# Patient Record
Sex: MALE | Race: WHITE | NOT HISPANIC OR LATINO | Employment: OTHER | URBAN - METROPOLITAN AREA
[De-identification: names, ages, dates, MRNs, and addresses within clinical notes are randomized per-mention and may not be internally consistent; named-entity substitution may affect disease eponyms.]

---

## 2022-05-12 ENCOUNTER — NURSING HOME VISIT (OUTPATIENT)
Dept: GERIATRICS | Facility: OTHER | Age: 87
End: 2022-05-12
Payer: MEDICARE

## 2022-05-12 DIAGNOSIS — I12.9 RENAL HYPERTENSION: ICD-10-CM

## 2022-05-12 DIAGNOSIS — F41.9 ANXIETY: ICD-10-CM

## 2022-05-12 DIAGNOSIS — S62.015D CLOSED NONDISPLACED FRACTURE OF DISTAL POLE OF SCAPHOID OF LEFT WRIST WITH ROUTINE HEALING, SUBSEQUENT ENCOUNTER: ICD-10-CM

## 2022-05-12 DIAGNOSIS — S72.002D CLOSED FRACTURE OF NECK OF LEFT FEMUR WITH ROUTINE HEALING, SUBSEQUENT ENCOUNTER: Primary | ICD-10-CM

## 2022-05-12 DIAGNOSIS — I10 ESSENTIAL HYPERTENSION: ICD-10-CM

## 2022-05-12 DIAGNOSIS — N13.30 HYDRONEPHROSIS OF LEFT KIDNEY: ICD-10-CM

## 2022-05-12 DIAGNOSIS — N40.0 BENIGN NON-NODULAR PROSTATIC HYPERPLASIA: ICD-10-CM

## 2022-05-12 DIAGNOSIS — R41.89 COGNITIVE IMPAIRMENT: ICD-10-CM

## 2022-05-12 DIAGNOSIS — E89.0 POSTOPERATIVE HYPOTHYROIDISM: ICD-10-CM

## 2022-05-12 DIAGNOSIS — N17.9 AKI (ACUTE KIDNEY INJURY) (HCC): ICD-10-CM

## 2022-05-12 PROBLEM — M81.0 OSTEOPOROSIS: Status: ACTIVE | Noted: 2019-09-12
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
## 2022-05-12 PROBLEM — S72.002A CLOSED FRACTURE OF NECK OF LEFT FEMUR (HCC): Status: ACTIVE | Noted: 2022-04-21

## 2022-05-12 PROBLEM — I95.1 ORTHOSTATIC HYPOTENSION: Status: ACTIVE | Noted: 2018-07-10

## 2022-05-12 PROBLEM — M15.9 PRIMARY OSTEOARTHRITIS INVOLVING MULTIPLE JOINTS: Status: ACTIVE | Noted: 2021-12-09

## 2022-05-12 PROBLEM — Z87.81 S/P LEFT HIP FRACTURE: Status: ACTIVE | Noted: 2022-04-27

## 2022-05-12 PROBLEM — I31.3 PERICARDIAL EFFUSION: Status: ACTIVE | Noted: 2022-04-27

## 2022-05-12 PROBLEM — I31.39 PERICARDIAL EFFUSION: Status: ACTIVE | Noted: 2022-04-27

## 2022-05-12 PROBLEM — S62.015A CLOSED NONDISPLACED FRACTURE OF DISTAL POLE OF SCAPHOID BONE OF LEFT WRIST: Status: ACTIVE | Noted: 2022-04-21

## 2022-05-12 PROBLEM — E55.9 VITAMIN D DEFICIENCY: Status: ACTIVE | Noted: 2019-09-12

## 2022-05-12 PROBLEM — K86.2 PANCREATIC CYST: Status: ACTIVE | Noted: 2022-04-21

## 2022-05-12 PROBLEM — C73 PRIMARY MALIGNANT NEOPLASM OF THYROID GLAND (HCC): Status: ACTIVE | Noted: 2019-09-12

## 2022-05-12 PROBLEM — I70.1 ATHEROSCLEROSIS OF RENAL ARTERY (HCC): Status: ACTIVE | Noted: 2019-10-29

## 2022-05-12 PROCEDURE — 99306 1ST NF CARE HIGH MDM 50: CPT | Performed by: INTERNAL MEDICINE

## 2022-05-12 RX ORDER — LORAZEPAM 0.5 MG/1
0.5 TABLET ORAL 2 TIMES DAILY
COMMUNITY
Start: 2022-05-11 | End: 2022-05-21

## 2022-05-12 RX ORDER — OXYCODONE HYDROCHLORIDE 5 MG/1
2.5 TABLET ORAL EVERY 6 HOURS PRN
COMMUNITY
Start: 2022-05-11 | End: 2022-05-21

## 2022-05-12 RX ORDER — DEXAMETHASONE 4 MG/1
TABLET ORAL
COMMUNITY
Start: 2021-12-12

## 2022-05-12 RX ORDER — LIDOCAINE 4 G/G
1 PATCH TOPICAL DAILY
COMMUNITY
Start: 2022-05-12

## 2022-05-12 RX ORDER — SENNA PLUS 8.6 MG/1
8.6 TABLET ORAL 2 TIMES DAILY
COMMUNITY
Start: 2022-05-11 | End: 2023-05-11

## 2022-05-12 RX ORDER — LEVOTHYROXINE SODIUM 0.15 MG/1
TABLET ORAL
COMMUNITY
Start: 2021-12-12

## 2022-05-12 RX ORDER — HEPARIN SODIUM 5000 [USP'U]/ML
5000 INJECTION, SOLUTION INTRAVENOUS; SUBCUTANEOUS EVERY 8 HOURS SCHEDULED
COMMUNITY
End: 2022-05-31

## 2022-05-12 RX ORDER — LOSARTAN POTASSIUM 25 MG/1
25 TABLET ORAL DAILY
COMMUNITY
Start: 2022-05-12 | End: 2023-05-12

## 2022-05-12 RX ORDER — OMEPRAZOLE 20 MG/1
1 CAPSULE, DELAYED RELEASE ORAL DAILY
COMMUNITY
Start: 2021-12-12

## 2022-05-12 RX ORDER — TRAZODONE HYDROCHLORIDE 50 MG/1
25 TABLET ORAL
COMMUNITY
Start: 2022-05-11 | End: 2022-06-10

## 2022-05-12 RX ORDER — CETIRIZINE HYDROCHLORIDE 10 MG/1
5 TABLET ORAL DAILY
COMMUNITY
Start: 2022-05-12 | End: 2023-05-12

## 2022-05-12 RX ORDER — FINASTERIDE 5 MG/1
5 TABLET, FILM COATED ORAL DAILY
COMMUNITY
Start: 2021-12-12

## 2022-05-12 RX ORDER — ACETAMINOPHEN 500 MG
1000 TABLET ORAL 3 TIMES DAILY
COMMUNITY
Start: 2022-05-11 | End: 2022-05-21

## 2022-05-12 RX ORDER — POLYETHYLENE GLYCOL 3350 17 G/17G
17 POWDER, FOR SOLUTION ORAL DAILY
COMMUNITY
Start: 2021-12-12

## 2022-05-12 RX ORDER — UREA 10 %
500 LOTION (ML) TOPICAL 4 TIMES DAILY PRN
COMMUNITY
Start: 2022-05-11 | End: 2023-05-11

## 2022-05-12 RX ORDER — TERAZOSIN 2 MG/1
2 CAPSULE ORAL
COMMUNITY
Start: 2021-12-12

## 2022-05-12 RX ORDER — CYCLOSPORINE 0.5 MG/ML
EMULSION OPHTHALMIC
COMMUNITY
Start: 2021-12-12

## 2022-05-12 NOTE — PROGRESS NOTES
[facility] notes  SHORT TERM REHAB       NAME: Mamadou Crowder  AGE: 80 y o  SEX: male    DATE OF ENCOUNTER: 5/12/2022    Assessment and Plan   Closed fracture of neck of left femur (HCC)  S/p left hip hemiarthroplasty on 4/22/22  WBAT  Pain controlled  Rehab consult   Heparin DVT prop for now   Continue follow up with orthopedics     Closed nondisplaced fracture of distal pole of scaphoid bone of left wrist  Non surgical management   Continue follow up with orthopedics  As per last report obtained patient is WBAT   Rehab consult  Pain controlled     Cognitive impairment  Daughter reports cognition has been on the decline even before hospitalization  Continue with supportive care  Continue with safety measures     SHERON (acute kidney injury) ([facility] [address] )  Seems to have improved  Also due urinary retention for which he had catheter and was removed at the acute rehab  But does have report of CKDIII  Will repeat BMP     Hydronephrosis of left kidney  Secondary to urinary retention which resolved with willard cath  Continue monitoring for any new symptoms of urinary retention     Essential hypertension  Continue current meds  Continue monitoring BP     Hypothyroidism  Continue synthroid  Secondary to thyroid resection and thyroid cancer    Benign non-nodular prostatic hyperplasia  Continue current meds  Continue monitoring symptoms    Renal hypertension  Hx of stent  Continue current meds     Anxiety  Continue current meds  Continue monitoring symptoms           Chief Complaint     No new complaints    History of Present Illness     81 yo male seen for admission to [address] [place] [address] after hospitalization  As per history obtained from patient he had a fall and his wife called 911 and took him to the hospital  But patient had to be cued for some history as memory seems to be not good   With cueing he was aware that he had left hip fracture and left wrist fracture then he recalls going somewhere by the [address] but does not know the place  Called daughter and talked to her to get the details  Patient was admitted to hospital after fall like he said and had surgery for his left hip and cast for the left wrist  He also developed urinary retention with left hydronephrosis which improved with the willard placement  Once he was stable he was discharged to acute rehab in [facility]  Patient continued with therapy and management for his left hip fracture and left wrist fracture then was discharged to [address] [place] [address] for continued therapy   Reviewed labs and imaging report from the hospital      PMHx     Past Medical History:   Diagnosis Date    Allergic     Anemia     Arthritis     Asthma     BPH (benign prostatic hyperplasia)     Chronic kidney disease     GERD (gastroesophageal reflux disease)     Hypertension     Other irritable bowel syndrome     Other specified hypothyroidism     Primary insomnia     Thyroid cancer (HCC)     invasive follicular lymphoma     Past Surgical History:   Procedure Laterality Date    APPENDECTOMY      CHOLECYSTECTOMY      RENAL ARTERY STENT Right     THYROIDECTOMY       Family History   Problem Relation Age of Onset    Colon cancer Mother     Kidney disease Father     Dementia Sister     Hypertension Sister      Social History     Socioeconomic History    Marital status: /Civil Union     Spouse name: None    Number of children: None    Years of education: None    Highest education level: None   Occupational History    None   Tobacco Use    Smoking status: Former Smoker    Smokeless tobacco: Never Used   Substance and Sexual Activity    Alcohol use: Not Currently    Drug use: Never    Sexual activity: None   Other Topics Concern    None   Social History Narrative    None     Social Determinants of Health     Financial Resource Strain: Not on file   Food Insecurity: Not on file   Transportation Needs: Not on file   Physical Activity: Not on file   Stress: Not on file Social Connections: Not on file   Intimate Partner Violence: Not on file   Housing Stability: Not on file     Allergies   Allergen Reactions    Ace Inhibitors Other (See Comments)     cough and rash  Other reaction(s): Other (See Comments)  cough and rash      Amoxicillin Other (See Comments)     GI upset  Other reaction(s): Other (See Comments)  GI upset         Review of Systems     Denies any pain or shortness of breath  All other review of system negative      Objective   Vital signs:  BP: 145/57  HR: 72  RR: 18  TEMP: 98 4F    PHYSICAL EXAM:  GENERAL: no acute distress  SKIN: warm, dry, no rash, no cyanosis  HEENT: normocephalic, atraumatic, no JVD, no Thyromegaly, no lymphadenopathy  LUNGS: CTA, no wheezing, no rales, expanded equally, no chest tenderness   HEART: normal rhythm, normal rate, no murmur, no gallop  ABDOMEN: soft non tender non distended bs+, no guarding or rebound tenderness  :  no suprapubic tenderness  MUSCULOSKELETAL: strength about 4+/5 all extremities except left lower decreased, ROM within normal, bilateral lower leg edema pitting +1, no calf tenderness  NEUROLOGY: awake, alert, Ox3 but did not know the facility name, able to recall 1/3 object but able to do the clock draw  CN2-12 intact  PSYCH: cooperative, pleasant  Pertinent Laboratory/Diagnostic Studies:  Recent labs and diagnostic tests reviewed in nursing home EMR    Current Medications   Medications reviewed and signed off on nursing home EMR

## 2022-05-12 NOTE — ASSESSMENT & PLAN NOTE
S/p left hip hemiarthroplasty on 4/22/22  WBAT  Pain controlled  Rehab consult   Heparin DVT prop for now   Continue follow up with orthopedics

## 2022-05-12 NOTE — ASSESSMENT & PLAN NOTE
Non surgical management   Continue follow up with orthopedics  As per last report obtained patient is WBAT   Rehab consult  Pain controlled

## 2022-05-12 NOTE — ASSESSMENT & PLAN NOTE
Seems to have improved  Also due urinary retention for which he had catheter and was removed at the acute rehab  But does have report of CKDIII  Will repeat BMP

## 2022-05-12 NOTE — ASSESSMENT & PLAN NOTE
Daughter reports cognition has been on the decline even before hospitalization  Continue with supportive care  Continue with safety measures

## 2022-05-12 NOTE — ASSESSMENT & PLAN NOTE
Secondary to urinary retention which resolved with willard cath  Continue monitoring for any new symptoms of urinary retention

## 2022-05-20 ENCOUNTER — NURSING HOME VISIT (OUTPATIENT)
Dept: GERIATRICS | Facility: OTHER | Age: 87
End: 2022-05-20
Payer: MEDICARE

## 2022-05-20 DIAGNOSIS — S62.015D CLOSED NONDISPLACED FRACTURE OF DISTAL POLE OF SCAPHOID OF LEFT WRIST WITH ROUTINE HEALING, SUBSEQUENT ENCOUNTER: ICD-10-CM

## 2022-05-20 DIAGNOSIS — N18.31 STAGE 3A CHRONIC KIDNEY DISEASE (HCC): ICD-10-CM

## 2022-05-20 DIAGNOSIS — S72.002D CLOSED FRACTURE OF NECK OF LEFT FEMUR WITH ROUTINE HEALING, SUBSEQUENT ENCOUNTER: Primary | ICD-10-CM

## 2022-05-20 DIAGNOSIS — D62 ACUTE BLOOD LOSS ANEMIA: ICD-10-CM

## 2022-05-20 PROCEDURE — 99309 SBSQ NF CARE MODERATE MDM 30: CPT | Performed by: INTERNAL MEDICINE

## 2022-05-20 NOTE — PROGRESS NOTES
[facility] notes  SHORT TERM REHAB       NAME: Micky Floyd  AGE: 80 y o  SEX: male    DATE OF ENCOUNTER: 5/20/2022    Assessment and Plan   Closed fracture of neck of left femur (HCC)  S/p left hip hemiarthroplasty on 4/22/22  WBAT  Continues to work with therapy  Pain seems to be controlled  Continue follow up with orthopedics   Continues on heparin for DVT prop    Stage 3a chronic kidney disease ([address] )  Cr up slightly at 1 31  Will repeat next week   Encourage fluids    Acute blood loss anemia  Hb down to 10 3  No signs of bleeding   Will repeat to monitor    Closed nondisplaced fracture of distal pole of scaphoid bone of left wrist  Seems to be doing well  Pain controlled  Continue follow up with orthopedics  WBAT      Chief Complaint     No new complaints     History of Present Illness     79 yo male seen for follow up  Patient seen for left hip fracture, anemia, SHERON and left wrist fracture  Reviewed nursing notes since last visit       PMHx     Past Medical History:   Diagnosis Date    Allergic     Anemia     Arthritis     Asthma     BPH (benign prostatic hyperplasia)     Chronic kidney disease     GERD (gastroesophageal reflux disease)     Hypertension     Other irritable bowel syndrome     Other specified hypothyroidism     Primary insomnia     Thyroid cancer (HCC)     invasive follicular lymphoma     Past Surgical History:   Procedure Laterality Date    APPENDECTOMY      CHOLECYSTECTOMY      RENAL ARTERY STENT Right     THYROIDECTOMY       Family History   Problem Relation Age of Onset    Colon cancer Mother     Kidney disease Father     Dementia Sister     Hypertension Sister      Social History     Socioeconomic History    Marital status: /Civil Union     Spouse name: Not on file    Number of children: Not on file    Years of education: Not on file    Highest education level: Not on file   Occupational History    Not on file   Tobacco Use    Smoking status: Former Smoker    Smokeless tobacco: Never Used   Substance and Sexual Activity    Alcohol use: Not Currently    Drug use: Never    Sexual activity: Not on file   Other Topics Concern    Not on file   Social History Narrative    Not on file     Social Determinants of Health     Financial Resource Strain: Not on file   Food Insecurity: Not on file   Transportation Needs: Not on file   Physical Activity: Not on file   Stress: Not on file   Social Connections: Not on file   Intimate Partner Violence: Not on file   Housing Stability: Not on file     Allergies   Allergen Reactions    Ace Inhibitors Other (See Comments)     cough and rash  Other reaction(s): Other (See Comments)  cough and rash      Amoxicillin Other (See Comments)     GI upset  Other reaction(s): Other (See Comments)  GI upset         Review of Systems     Constipated  All other review of system negative      Objective   Vital signs:  BP: 140/57  HR: 75  RR: 16  TEMP: 98 6F      PHYSICAL EXAM:  GENERAL: no acute distress  SKIN: warm, dry, no rash, no cyanosis  HEENT: normocephalic, atraumatic, no JVD, no Thyromegaly, no lymphadenopathy  LUNGS: CTA, no wheezing, no rales, expanded equally, no chest tenderness   HEART: normal rhythm, normal rate, no murmur, no gallop  ABDOMEN: soft non tender non distended bs+, no guarding or rebound tenderness  :  no suprapubic tenderness  MUSCULOSKELETAL: strength about 4+/5 all extremities except left lower decreased, bilateral lower leg edema, no calf tenderness  NEUROLOGY: awake, alert, CN2-12 intact  PSYCH: cooperative, pleasant         Pertinent Laboratory/Diagnostic Studies:  Recent labs and diagnostic tests reviewed in nursing home EMR    Current Medications   Medications reviewed

## 2022-05-20 NOTE — ASSESSMENT & PLAN NOTE
S/p left hip hemiarthroplasty on 4/22/22  WBAT  Continues to work with therapy  Pain seems to be controlled  Continue follow up with orthopedics   Continues on heparin for DVT prop

## 2022-05-25 ENCOUNTER — NURSING HOME VISIT (OUTPATIENT)
Dept: GERIATRICS | Facility: OTHER | Age: 87
End: 2022-05-25
Payer: MEDICARE

## 2022-05-25 DIAGNOSIS — S72.002D CLOSED FRACTURE OF NECK OF LEFT FEMUR WITH ROUTINE HEALING, SUBSEQUENT ENCOUNTER: Primary | ICD-10-CM

## 2022-05-25 DIAGNOSIS — I10 ESSENTIAL HYPERTENSION: ICD-10-CM

## 2022-05-25 DIAGNOSIS — D62 ACUTE BLOOD LOSS ANEMIA: ICD-10-CM

## 2022-05-25 PROCEDURE — 99309 SBSQ NF CARE MODERATE MDM 30: CPT | Performed by: INTERNAL MEDICINE

## 2022-05-25 RX ORDER — NIFEDIPINE 60 MG/1
60 TABLET, EXTENDED RELEASE ORAL DAILY
COMMUNITY

## 2022-05-25 RX ORDER — OXYCODONE HYDROCHLORIDE 5 MG/1
2.5 TABLET ORAL EVERY 6 HOURS PRN
COMMUNITY
End: 2022-05-31

## 2022-05-25 NOTE — ASSESSMENT & PLAN NOTE
S/p left hip hemiarthroplasty on 4/22/22  WBAT  Continues with therapy  Reviewed therapy notes  Therapist report some difficulty with the left knee   Continue with safety measures  Continue follow up with orthopedics as scheduled

## 2022-05-25 NOTE — PROGRESS NOTES
[facility] notes  SHORT TERM REHAB       NAME: Hayes Christiansen  AGE: 80 y o  SEX: male    DATE OF ENCOUNTER: 5/25/2022    Assessment and Plan   Closed fracture of neck of left femur (HCC)  S/p left hip hemiarthroplasty on 4/22/22  WBAT  Continues with therapy  Reviewed therapy notes  Therapist report some difficulty with the left knee   Continue with safety measures  Continue follow up with orthopedics as scheduled     Acute blood loss anemia  Repeat Hb was at 10 8 on 5/23/22  No signs of bleeding and h/h stable    Essential hypertension  BP reviewed since admission seems to be in acceptable range  Continue current meds  Continue monitoring BP        Chief Complaint     No new complaints     History of Present Illness     81 yo male seen for follow up  Patient seen for left hip fracture s/p surgery, anemia and HTN  Reviewed nursing notes since last visit       PMHx     Past Medical History:   Diagnosis Date    Allergic     Anemia     Arthritis     Asthma     BPH (benign prostatic hyperplasia)     Chronic kidney disease     GERD (gastroesophageal reflux disease)     Hypertension     Other irritable bowel syndrome     Other specified hypothyroidism     Primary insomnia     Thyroid cancer (HCC)     invasive follicular lymphoma     Past Surgical History:   Procedure Laterality Date    APPENDECTOMY      CHOLECYSTECTOMY      RENAL ARTERY STENT Right     THYROIDECTOMY       Family History   Problem Relation Age of Onset    Colon cancer Mother     Kidney disease Father     Dementia Sister     Hypertension Sister      Social History     Socioeconomic History    Marital status: /Civil Union     Spouse name: Not on file    Number of children: Not on file    Years of education: Not on file    Highest education level: Not on file   Occupational History    Not on file   Tobacco Use    Smoking status: Former Smoker    Smokeless tobacco: Never Used   Substance and Sexual Activity  Alcohol use: Not Currently    Drug use: Never    Sexual activity: Not on file   Other Topics Concern    Not on file   Social History Narrative    Not on file     Social Determinants of Health     Financial Resource Strain: Not on file   Food Insecurity: Not on file   Transportation Needs: Not on file   Physical Activity: Not on file   Stress: Not on file   Social Connections: Not on file   Intimate Partner Violence: Not on file   Housing Stability: Not on file     Allergies   Allergen Reactions    Ace Inhibitors Other (See Comments)     cough and rash  Other reaction(s): Other (See Comments)  cough and rash      Amoxicillin Other (See Comments)     GI upset  Other reaction(s): Other (See Comments)  GI upset         Review of Systems     Denies any pain or shortness of breath  All other review of system negative        Objective   Vital signs:  BP: 128/74  HR: 68  RR: 18  TEMP: 98 9F    PHYSICAL EXAM:  GENERAL: no acute distress  SKIN: warm, dry, no rash, no cyanosis  HEENT: normocephalic, atraumatic, no JVD, no Thyromegaly, no lymphadenopathy  LUNGS: CTA, no wheezing, no rales, expanded equally, no chest tenderness   HEART: normal rhythm, normal rate, no murmur, no gallop  ABDOMEN: soft non tender non distended bs+, no guarding or rebound tenderness  :  no suprapubic tenderness  MUSCULOSKELETAL: strength about 4+/5 all extremities except left lower leg weaker, bilateral lower leg edema, no calf tenderness  NEUROLOGY: awake, alert, CN2-12 intact  PSYCH: cooperative, pleasant         Pertinent Laboratory/Diagnostic Studies:  Recent labs and diagnostic tests reviewed in nursing home EMR    Current Medications   Medications reviewed

## 2022-05-25 NOTE — ASSESSMENT & PLAN NOTE
BP reviewed since admission seems to be in acceptable range  Continue current meds  Continue monitoring BP

## 2022-05-31 ENCOUNTER — NURSING HOME VISIT (OUTPATIENT)
Dept: GERIATRICS | Facility: OTHER | Age: 87
End: 2022-05-31
Payer: MEDICARE

## 2022-05-31 DIAGNOSIS — S62.015D CLOSED NONDISPLACED FRACTURE OF DISTAL POLE OF SCAPHOID OF LEFT WRIST WITH ROUTINE HEALING, SUBSEQUENT ENCOUNTER: ICD-10-CM

## 2022-05-31 DIAGNOSIS — K59.04 CHRONIC IDIOPATHIC CONSTIPATION: ICD-10-CM

## 2022-05-31 DIAGNOSIS — S72.002D CLOSED FRACTURE OF NECK OF LEFT FEMUR WITH ROUTINE HEALING, SUBSEQUENT ENCOUNTER: Primary | ICD-10-CM

## 2022-05-31 PROCEDURE — 99309 SBSQ NF CARE MODERATE MDM 30: CPT | Performed by: INTERNAL MEDICINE

## 2022-05-31 NOTE — ASSESSMENT & PLAN NOTE
Doing well  Pain controolled  Continue follow up with orthopedics  Continues with therapy and no problem

## 2022-05-31 NOTE — PROGRESS NOTES
[facility] notes  SHORT TERM REHAB       NAME: Des Cooney  AGE: 80 y o  SEX: male    DATE OF ENCOUNTER: 5/31/2022    Assessment and Plan   Closed fracture of neck of left femur (HCC)  S/p left hip hemiarthroplasty  WBAT  Will discontinue heparin as patient is ambulatory   Continue with safety measures  Reviewed therapy notes  Pain controlled and not using prn oxycodone so will discontinue  Continue follow up with orthopedics     Closed nondisplaced fracture of distal pole of scaphoid bone of left wrist  Doing well  Pain controolled  Continue follow up with orthopedics  Continues with therapy and no problem    Chronic idiopathic constipation  Continue current meds  Seems to be stable  Continue monitoring symptoms       Chief Complaint     No new complaints    History of Present Illness     81 yo male seen for follow up  Patient seen for his left femur fracture s/p hemiarthroplasty, left wrist fracture and constipation  Reviewed nursing notes since last visit       PMHx     Past Medical History:   Diagnosis Date    Allergic     Anemia     Arthritis     Asthma     BPH (benign prostatic hyperplasia)     Chronic kidney disease     GERD (gastroesophageal reflux disease)     Hypertension     Other irritable bowel syndrome     Other specified hypothyroidism     Primary insomnia     Thyroid cancer (HCC)     invasive follicular lymphoma     Past Surgical History:   Procedure Laterality Date    APPENDECTOMY      CHOLECYSTECTOMY      RENAL ARTERY STENT Right     THYROIDECTOMY       Family History   Problem Relation Age of Onset    Colon cancer Mother     Kidney disease Father     Dementia Sister     Hypertension Sister      Social History     Socioeconomic History    Marital status: /Civil Union     Spouse name: Not on file    Number of children: Not on file    Years of education: Not on file    Highest education level: Not on file   Occupational History    Not on file Tobacco Use    Smoking status: Former Smoker    Smokeless tobacco: Never Used   Substance and Sexual Activity    Alcohol use: Not Currently    Drug use: Never    Sexual activity: Not on file   Other Topics Concern    Not on file   Social History Narrative    Not on file     Social Determinants of Health     Financial Resource Strain: Not on file   Food Insecurity: Not on file   Transportation Needs: Not on file   Physical Activity: Not on file   Stress: Not on file   Social Connections: Not on file   Intimate Partner Violence: Not on file   Housing Stability: Not on file     Allergies   Allergen Reactions    Ace Inhibitors Other (See Comments)     cough and rash  Other reaction(s): Other (See Comments)  cough and rash      Amoxicillin Other (See Comments)     GI upset  Other reaction(s): Other (See Comments)  GI upset         Review of Systems     Left knee pain and weakness  All other review of system negative      Objective   Vital signs:  BP: 137/62  HR: 75  RR: 18  TEMP: 98 0F    PHYSICAL EXAM:  GENERAL: no acute distress  SKIN: warm, dry, no rash, no cyanosis  HEENT: normocephalic, atraumatic, no JVD, no Thyromegaly, no lymphadenopathy  LUNGS: CTA, no wheezing, no rales, expanded equally, no chest tenderness   HEART: normal rhythm, normal rate, no murmur, no gallop  ABDOMEN: soft non tender non distended bs+, no guarding or rebound tenderness  :  no suprapubic tenderness  MUSCULOSKELETAL: strength about 4+/5 all extremities except left lower decreased,  bilateral lower leg edema, no calf tenderness  NEUROLOGY: awake, alert, CN2-12 intact  PSYCH: cooperative, pleasant         Pertinent Laboratory/Diagnostic Studies:  Recent labs and diagnostic tests reviewed in nursing home EMR    Current Medications   Medications reviewed

## 2022-05-31 NOTE — ASSESSMENT & PLAN NOTE
S/p left hip hemiarthroplasty  WBAT  Will discontinue heparin as patient is ambulatory   Continue with safety measures  Reviewed therapy notes  Pain controlled and not using prn oxycodone so will discontinue  Continue follow up with orthopedics

## 2022-06-01 ENCOUNTER — NURSING HOME VISIT (OUTPATIENT)
Dept: GERIATRICS | Facility: OTHER | Age: 87
End: 2022-06-01
Payer: MEDICARE

## 2022-06-01 DIAGNOSIS — S62.015D CLOSED NONDISPLACED FRACTURE OF DISTAL POLE OF SCAPHOID OF LEFT WRIST WITH ROUTINE HEALING, SUBSEQUENT ENCOUNTER: ICD-10-CM

## 2022-06-01 DIAGNOSIS — I12.9 RENAL HYPERTENSION: ICD-10-CM

## 2022-06-01 DIAGNOSIS — F41.9 ANXIETY: ICD-10-CM

## 2022-06-01 DIAGNOSIS — E89.0 POSTOPERATIVE HYPOTHYROIDISM: ICD-10-CM

## 2022-06-01 DIAGNOSIS — N17.9 AKI (ACUTE KIDNEY INJURY) (HCC): ICD-10-CM

## 2022-06-01 DIAGNOSIS — S72.002D CLOSED FRACTURE OF NECK OF LEFT FEMUR WITH ROUTINE HEALING, SUBSEQUENT ENCOUNTER: Primary | ICD-10-CM

## 2022-06-01 DIAGNOSIS — R41.89 COGNITIVE IMPAIRMENT: ICD-10-CM

## 2022-06-01 DIAGNOSIS — I10 ESSENTIAL HYPERTENSION: ICD-10-CM

## 2022-06-01 DIAGNOSIS — D62 ACUTE BLOOD LOSS ANEMIA: ICD-10-CM

## 2022-06-01 PROCEDURE — 99316 NF DSCHRG MGMT 30 MIN+: CPT | Performed by: INTERNAL MEDICINE

## 2022-06-01 NOTE — ASSESSMENT & PLAN NOTE
Continue with supportive care  Continue follow up with PCP for further evaluation  Continue with safety measures

## 2022-06-01 NOTE — PROGRESS NOTES
[facility] notes  SHORT TERM REHAB Discharge summary      NAME: Mirtha Vizcaino  AGE: 80 y o  SEX: male    DATE OF ENCOUNTER: 6/1/2022    Assessment and Plan   Closed fracture of neck of left femur (HCC)  S/p left hip hemiarthroplasty  WBAT  Reviewed therapy notes  Continue with safety measures at home  Continue follow up with orthopedics  Pain seems to be controlled        Closed nondisplaced fracture of distal pole of scaphoid bone of left wrist  No pain  Continue with activity as tolerable  Continue follow up with orthopedics      Cognitive impairment  Continue with supportive care  Continue follow up with PCP for further evaluation  Continue with safety measures     SHERON (acute kidney injury) ([facility] [address] )  Improved   Cr  1 19 on 5/23/22  Continue to encourage fluids  Follow up with PCP    Anxiety  Seems to be stable  Continue with current meds      Acute blood loss anemia  Hb stable   Last one was 10 8  Continue monitoring as out patient     Essential hypertension  BP reviewed from facility records, acceptable range  Continue monitoring at home   Continue current meds     Hypothyroidism  Continue synthroid  Continue follow up with pcp     Renal hypertension  Hx of stent   Continue follow up with PCP  Continue current meds           Chief Complaint     No new complaints     History of Present Illness     81 yo male seen for discharge  [facility] after fall resulting in left hip fracture and left wrist fracture for which he had surgery on the left hip and managed non operatively on the left wrist  During his hospital stay also had hydronephrosis believed to be due to urinary retention and was managed for it with willard cath  He had it removed and has been stable without willard  Patient was also managed in acute rehab prior to coming to [facility]  Patient continued with therapy here and now ready for discharge home   Reviewed nursing notes since last visit  PMHx     Past Medical History:   Diagnosis Date    Allergic     Anemia     Arthritis     Asthma     BPH (benign prostatic hyperplasia)     Chronic kidney disease     GERD (gastroesophageal reflux disease)     Hypertension     Other irritable bowel syndrome     Other specified hypothyroidism     Primary insomnia     Thyroid cancer (HCC)     invasive follicular lymphoma     Past Surgical History:   Procedure Laterality Date    APPENDECTOMY      CHOLECYSTECTOMY      RENAL ARTERY STENT Right     THYROIDECTOMY       Family History   Problem Relation Age of Onset    Colon cancer Mother     Kidney disease Father     Dementia Sister     Hypertension Sister      Social History     Socioeconomic History    Marital status: /Civil Union     Spouse name: Not on file    Number of children: Not on file    Years of education: Not on file    Highest education level: Not on file   Occupational History    Not on file   Tobacco Use    Smoking status: Former Smoker    Smokeless tobacco: Never Used   Substance and Sexual Activity    Alcohol use: Not Currently    Drug use: Never    Sexual activity: Not on file   Other Topics Concern    Not on file   Social History Narrative    Not on file     Social Determinants of Health     Financial Resource Strain: Not on file   Food Insecurity: Not on file   Transportation Needs: Not on file   Physical Activity: Not on file   Stress: Not on file   Social Connections: Not on file   Intimate Partner Violence: Not on file   Housing Stability: Not on file     Allergies   Allergen Reactions    Ace Inhibitors Other (See Comments)     cough and rash  Other reaction(s): Other (See Comments)  cough and rash      Amoxicillin Other (See Comments)     GI upset  Other reaction(s):  Other (See Comments)  GI upset         Review of Systems     Denies any pain or shortness of breath  Has been having problem with his left knee for awhile  All other review of system negative      Objective       PHYSICAL EXAM:  GENERAL: no acute distress  SKIN: warm, dry, no rash, no cyanosis  HEENT: normocephalic, atraumatic, no JVD, no Thyromegaly, no lymphadenopathy  LUNGS: CTA, no wheezing, no rales, expanded equally, no chest tenderness   HEART: normal rhythm, normal rate, no murmur, no gallop  ABDOMEN: soft non tender non distended bs+, no guarding or rebound tenderness  :  no suprapubic tenderness  MUSCULOSKELETAL: strength about 4+/5 all extremities except left knee, ROM within normal, bilateral lower leg edema, no calf tenderness  NEUROLOGY: awake, alert, CN2-12 intact  PSYCH: cooperative, pleasant         Pertinent Laboratory/Diagnostic Studies:  Recent labs and diagnostic tests reviewed in nursing home EMR    Current Medications  Medications reviewed with patient/family  Prescriptions provided for medications needed    Prescriptions provided for services needed    Time spend on patient discharge 35 minutes

## 2022-06-01 NOTE — ASSESSMENT & PLAN NOTE
BP reviewed from facility records, acceptable range  Continue monitoring at home   Continue current meds

## 2022-06-01 NOTE — ASSESSMENT & PLAN NOTE
S/p left hip hemiarthroplasty  WBAT  Reviewed therapy notes  Continue with safety measures at home  Continue follow up with orthopedics  Pain seems to be controlled

## 2023-11-20 ENCOUNTER — NURSING HOME VISIT (OUTPATIENT)
Dept: GERIATRICS | Facility: OTHER | Age: 88
End: 2023-11-20
Payer: MEDICARE

## 2023-11-20 DIAGNOSIS — K59.04 CHRONIC IDIOPATHIC CONSTIPATION: ICD-10-CM

## 2023-11-20 DIAGNOSIS — N40.0 BENIGN NON-NODULAR PROSTATIC HYPERPLASIA: ICD-10-CM

## 2023-11-20 DIAGNOSIS — E89.0 POSTOPERATIVE HYPOTHYROIDISM: ICD-10-CM

## 2023-11-20 DIAGNOSIS — I10 ESSENTIAL HYPERTENSION: ICD-10-CM

## 2023-11-20 DIAGNOSIS — K21.9 GASTROESOPHAGEAL REFLUX DISEASE WITHOUT ESOPHAGITIS: ICD-10-CM

## 2023-11-20 DIAGNOSIS — R26.2 AMBULATORY DYSFUNCTION: ICD-10-CM

## 2023-11-20 DIAGNOSIS — N18.31 STAGE 3A CHRONIC KIDNEY DISEASE (HCC): ICD-10-CM

## 2023-11-20 DIAGNOSIS — F01.B3 MODERATE VASCULAR DEMENTIA WITH MOOD DISTURBANCE (HCC): Primary | ICD-10-CM

## 2023-11-20 PROBLEM — R33.9 URINARY RETENTION: Status: ACTIVE | Noted: 2022-07-20

## 2023-11-20 PROBLEM — E78.5 DYSLIPIDEMIA: Status: ACTIVE | Noted: 2023-01-02

## 2023-11-20 PROCEDURE — 99306 1ST NF CARE HIGH MDM 50: CPT | Performed by: INTERNAL MEDICINE

## 2023-11-20 RX ORDER — DEXTROMETHORPHAN HYDROBROMIDE AND PROMETHAZINE HYDROCHLORIDE 15; 6.25 MG/5ML; MG/5ML
5 SYRUP ORAL 4 TIMES DAILY PRN
COMMUNITY
Start: 2023-06-09

## 2023-11-20 RX ORDER — TRAZODONE HYDROCHLORIDE 50 MG/1
50 TABLET ORAL
COMMUNITY
Start: 2023-09-07

## 2023-11-20 RX ORDER — LOSARTAN POTASSIUM 25 MG/1
25 TABLET ORAL DAILY
COMMUNITY

## 2023-11-20 RX ORDER — SENNOSIDES A AND B 8.6 MG/1
1 TABLET, FILM COATED ORAL 2 TIMES DAILY
COMMUNITY

## 2023-11-20 RX ORDER — CALCIUM CARBONATE 500 MG/1
1 TABLET, CHEWABLE ORAL 4 TIMES DAILY PRN
COMMUNITY

## 2023-11-20 NOTE — ASSESSMENT & PLAN NOTE
Continue with supportive care  Found to be living in poor condition and wife also admitted to the hospital and was the primary care giver with support  Continue with safety measures  Continue current meds  Continue monitoring symptoms

## 2023-11-20 NOTE — PROGRESS NOTES
[facility] [address] [facility] home notes  SHORT TERM REHAB       NAME: Jeison Mcginnis  AGE: 80 y.o. SEX: male    DATE OF ENCOUNTER: 11/20/2023    Assessment and Plan   Moderate vascular dementia with mood disturbance ([address] [facility])  Continue with supportive care  Found to be living in poor condition and wife also admitted to the hospital and was the primary care giver with support  Continue with safety measures  Continue current meds  Continue monitoring symptoms      Essential hypertension  Continue current meds  Continue monitoring BP    Chronic idiopathic constipation  Continue current meds  Continue monitoring symptoms     Benign non-nodular prostatic hyperplasia  Continue monitoring for retention  Continue current meds  Continue monitoring symptoms     Stage 3a chronic kidney disease (HCC)  Last cr stable a 1.34  Continue encouraging fluids  Continue monitoring Cr    Gastroesophageal reflux disease without esophagitis  Continue PPI  Continue monitoring symptoms     Hypothyroidism  Last TSH in chart is in April and was elevated  Will repeat TSH with the labs ordered  Continue current dose of synthroid     Ambulatory dysfunction  Hx of falls  Continue with safety measures  Continue with fall precautions  PT/OT eval and treat       Chief Complaint     No new complaints     History of Present Illness     79 yo male seen for admission to [address] [place] [address] after hospitalization. Patient cannot give history due to his dementia. History obtained from daughter over the phone. As per information obtained patient was taken to the hospital due to his wife being admitted to the hospital and she was caring for him because of his cognition. Also reported that the house was not in good condition and has not been taken care of.  At which point he was admitted to the hospital and managed for the chronic medical condition and once stable he was discharged along with wife to [address] [place] [address] for therapy Reviewed labs from the hospital records. PMHx     Past Medical History:   Diagnosis Date    Allergic     Anemia     Arthritis     Asthma     BPH (benign prostatic hyperplasia)     Chronic kidney disease     GERD (gastroesophageal reflux disease)     Hypertension     Other irritable bowel syndrome     Other specified hypothyroidism     Primary insomnia     Thyroid cancer ([address])     invasive follicular lymphoma     Past Surgical History:   Procedure Laterality Date    APPENDECTOMY      CHOLECYSTECTOMY      RENAL ARTERY STENT Right     THYROIDECTOMY       Family History   Problem Relation Age of Onset    Colon cancer Mother     Kidney disease Father     Dementia Sister     Hypertension Sister      Social History     Socioeconomic History    Marital status: /Civil Union     Spouse name: None    Number of children: None    Years of education: None    Highest education level: None   Occupational History    None   Tobacco Use    Smoking status: Former    Smokeless tobacco: Never   Substance and Sexual Activity    Alcohol use: Not Currently    Drug use: Never    Sexual activity: None   Other Topics Concern    None   Social History Narrative    None     Social Determinants of Health     Financial Resource Strain: Not on file   Food Insecurity: Not on file   Transportation Needs: Not on file   Physical Activity: Not on file   Stress: Not on file   Social Connections: Not on file   Intimate Partner Violence: Not on file   Housing Stability: Not on file     Allergies   Allergen Reactions    Ace Inhibitors Other (See Comments)     cough and rash  Other reaction(s): Other (See Comments)  cough and rash      Amoxicillin Other (See Comments)     GI upset  Other reaction(s):  Other (See Comments)  GI upset         Review of Systems     Denies any pain or shortness of breath  All other review of system negative        Objective   Vital signs:  BP: 116/49  HR: 63  RR: 18  TEMP: 98.2F  SAT.: 95% on RA      PHYSICAL EXAM:  GENERAL: no acute distress  SKIN: warm, dry, no cyanosis, right ear with a growth that has some bleeding  HEENT: normocephalic, atraumatic, no JVD, no Thyromegaly, no lymphadenopathy  LUNGS: CTA, no wheezing, no rales, expanded equally, no chest tenderness   HEART: normal rhythm, normal rate, no murmur, no gallop  ABDOMEN: soft non tender non distended bs+, no guarding or rebound tenderness  :  no suprapubic tenderness  MUSCULOSKELETAL: strength about 4+/5 all extremities, ROM within normal, bilateral pedal edema, no calf tenderness  NEUROLOGY: awake, alert, Oriented to person, not place but oriented to city and state also oriented to date but off by few days, able to recall 1/3 object. CN2-12 intact. PSYCH: cooperative, pleasant. Pertinent Laboratory/Diagnostic Studies:  Recent labs and diagnostic tests reviewed in nursing home EMR    Current Medications   Medications reviewed and signed off on nursing home EMR.

## 2023-11-20 NOTE — ASSESSMENT & PLAN NOTE
Last TSH in chart is in April and was elevated  Will repeat TSH with the labs ordered  Continue current dose of synthroid

## 2023-11-29 ENCOUNTER — NURSING HOME VISIT (OUTPATIENT)
Dept: GERIATRICS | Facility: OTHER | Age: 88
End: 2023-11-29
Payer: MEDICARE

## 2023-11-29 DIAGNOSIS — F41.9 ANXIETY: ICD-10-CM

## 2023-11-29 DIAGNOSIS — I10 ESSENTIAL HYPERTENSION: ICD-10-CM

## 2023-11-29 DIAGNOSIS — R26.2 AMBULATORY DYSFUNCTION: Primary | ICD-10-CM

## 2023-11-29 PROCEDURE — 99309 SBSQ NF CARE MODERATE MDM 30: CPT | Performed by: INTERNAL MEDICINE

## 2023-11-29 NOTE — PROGRESS NOTES
[facility] [address] [facility] home notes  SHORT TERM REHAB       NAME: Kirstin Ozuna  AGE: 80 y.o. SEX: male    DATE OF ENCOUNTER: 11/29/2023    Assessment and Plan   Ambulatory dysfunction  Hx of falls  Continue with safety measures  Continue with fall precautions  Reviewed therapy notes   Discussed with therapy about patient     Anxiety  Seems stable  Continue current meds  Continue monitoring symptoms     Essential hypertension  BP reviewed from facility records, mostly acceptable range with some elevated BP   Continue current meds  Continue monitoring BP        Chief Complaint     No new complaints    History of Present Illness     79 yo male seen for follow up. Patient seen for ambulatory dysfunction, anxiety and HTN. Reviewed nursing notes since last visit.      PMHx     Past Medical History:   Diagnosis Date    Allergic     Anemia     Arthritis     Asthma     BPH (benign prostatic hyperplasia)     Chronic kidney disease     GERD (gastroesophageal reflux disease)     Hypertension     Other irritable bowel syndrome     Other specified hypothyroidism     Primary insomnia     Thyroid cancer ([address])     invasive follicular lymphoma     Past Surgical History:   Procedure Laterality Date    APPENDECTOMY      CHOLECYSTECTOMY      RENAL ARTERY STENT Right     THYROIDECTOMY       Family History   Problem Relation Age of Onset    Colon cancer Mother     Kidney disease Father     Dementia Sister     Hypertension Sister      Social History     Socioeconomic History    Marital status: /Civil Union     Spouse name: Not on file    Number of children: Not on file    Years of education: Not on file    Highest education level: Not on file   Occupational History    Not on file   Tobacco Use    Smoking status: Former    Smokeless tobacco: Never   Substance and Sexual Activity    Alcohol use: Not Currently    Drug use: Never    Sexual activity: Not on file   Other Topics Concern    Not on file   Social History Narrative    Not on file     Social Determinants of Health     Financial Resource Strain: Not on file   Food Insecurity: Not on file   Transportation Needs: Not on file   Physical Activity: Not on file   Stress: Not on file   Social Connections: Not on file   Intimate Partner Violence: Not on file   Housing Stability: Not on file     Allergies   Allergen Reactions    Ace Inhibitors Other (See Comments)     cough and rash  Other reaction(s): Other (See Comments)  cough and rash      Amoxicillin Other (See Comments)     GI upset  Other reaction(s): Other (See Comments)  GI upset         Review of Systems     Denies any pain or shortness of breath   All other review of system negative        Objective   Vital signs reviewed from facility records. PHYSICAL EXAM:  GENERAL: no acute distress  SKIN: warm, dry, no rash, no cyanosis  HEENT: normocephalic, atraumatic, no JVD, no Thyromegaly, no lymphadenopathy  LUNGS: CTA, no wheezing, no rales, expanded equally, no chest tenderness   HEART: normal rhythm, normal rate, no murmur, no gallop  ABDOMEN: soft non tender non distended bs+, no guarding or rebound tenderness  :  no suprapubic tenderness  MUSCULOSKELETAL: strength about 4+/5 all extremities, ROM within normal, no calf tenderness  NEUROLOGY: awake, alert, CN2-12 intact. PSYCH: cooperative, pleasant. Pertinent Laboratory/Diagnostic Studies:  Recent labs and diagnostic tests reviewed in nursing home EMR    Current Medications   Medications reviewed and signed off on nursing home EMR.

## 2023-11-29 NOTE — ASSESSMENT & PLAN NOTE
BP reviewed from facility records, mostly acceptable range with some elevated BP   Continue current meds  Continue monitoring BP

## 2023-11-29 NOTE — ASSESSMENT & PLAN NOTE
Hx of falls  Continue with safety measures  Continue with fall precautions  Reviewed therapy notes   Discussed with therapy about patient

## 2023-12-07 ENCOUNTER — NURSING HOME VISIT (OUTPATIENT)
Dept: GERIATRICS | Facility: OTHER | Age: 88
End: 2023-12-07
Payer: MEDICARE

## 2023-12-07 DIAGNOSIS — K21.9 GASTROESOPHAGEAL REFLUX DISEASE WITHOUT ESOPHAGITIS: ICD-10-CM

## 2023-12-07 DIAGNOSIS — I10 ESSENTIAL HYPERTENSION: ICD-10-CM

## 2023-12-07 DIAGNOSIS — R26.2 AMBULATORY DYSFUNCTION: Primary | ICD-10-CM

## 2023-12-07 DIAGNOSIS — F41.9 ANXIETY: ICD-10-CM

## 2023-12-07 PROCEDURE — 99309 SBSQ NF CARE MODERATE MDM 30: CPT | Performed by: INTERNAL MEDICINE

## 2023-12-07 NOTE — PROGRESS NOTES
[facility] [address] [facility] home notes  SHORT TERM REHAB       NAME: Kenya Kang  AGE: 80 y.o. SEX: male    DATE OF ENCOUNTER: 12/7/2023    Assessment and Plan   Ambulatory dysfunction  Hx of falls  Continues to work with therapy   Reviewed therapy notes  Discussed with therapy about patient  Continue with safety measures and fall precautions     Anxiety  Seems stable  Continue current meds  Continue monitoring symptoms     Essential hypertension  BP reviewed from facility records, acceptable range  Continue current meds  Continue monitoring BP    Gastroesophageal reflux disease without esophagitis  No complaints  Continue current meds         Chief Complaint     No new complaints    History of Present Illness     79 yo male seen for follow up. Patient seen for ambulatory dysfunction, HTN, GERD and anxiety. Reviewed nursing notes since last visit.     PMHx     Past Medical History:   Diagnosis Date    Allergic     Anemia     Arthritis     Asthma     BPH (benign prostatic hyperplasia)     Chronic kidney disease     GERD (gastroesophageal reflux disease)     Hypertension     Other irritable bowel syndrome     Other specified hypothyroidism     Primary insomnia     Thyroid cancer ([address])     invasive follicular lymphoma     Past Surgical History:   Procedure Laterality Date    APPENDECTOMY      CHOLECYSTECTOMY      RENAL ARTERY STENT Right     THYROIDECTOMY       Family History   Problem Relation Age of Onset    Colon cancer Mother     Kidney disease Father     Dementia Sister     Hypertension Sister      Social History     Socioeconomic History    Marital status: /Civil Union     Spouse name: Not on file    Number of children: Not on file    Years of education: Not on file    Highest education level: Not on file   Occupational History    Not on file   Tobacco Use    Smoking status: Former    Smokeless tobacco: Never   Substance and Sexual Activity    Alcohol use: Not Currently    Drug use: Never    Sexual activity: Not on file   Other Topics Concern    Not on file   Social History Narrative    Not on file     Social Determinants of Health     Financial Resource Strain: Not on file   Food Insecurity: Not on file   Transportation Needs: Not on file   Physical Activity: Not on file   Stress: Not on file   Social Connections: Not on file   Intimate Partner Violence: Not on file   Housing Stability: Not on file     Allergies   Allergen Reactions    Ace Inhibitors Other (See Comments)     cough and rash  Other reaction(s): Other (See Comments)  cough and rash      Amoxicillin Other (See Comments)     GI upset  Other reaction(s): Other (See Comments)  GI upset         Review of Systems     Denies any pain or shortness of breath   All other review of system negative        Objective   Vital signs reviewed from facility records. PHYSICAL EXAM:  GENERAL: no acute distress  SKIN: warm, dry, no rash, no cyanosis  HEENT: normocephalic, atraumatic, no JVD, no Thyromegaly, no lymphadenopathy  LUNGS: CTA, no wheezing, no rales, expanded equally, no chest tenderness   HEART: normal rhythm, normal rate, no murmur, no gallop  ABDOMEN: soft non tender non distended bs+, no guarding or rebound tenderness  :  no suprapubic tenderness  MUSCULOSKELETAL: strength about 4+/5 all extremities, ROM within normal, bilateral pedal edema no calf tenderness  NEUROLOGY: awake, alert, CN2-12 intact. PSYCH: cooperative, pleasant. Pertinent Laboratory/Diagnostic Studies:  Recent labs and diagnostic tests reviewed in nursing home EMR    Current Medications   Medications reviewed and signed off on nursing home EMR.

## 2023-12-07 NOTE — ASSESSMENT & PLAN NOTE
BP reviewed from facility records, acceptable range  Continue current meds  Continue monitoring BP Statement Selected

## 2023-12-07 NOTE — ASSESSMENT & PLAN NOTE
Hx of falls  Continues to work with therapy   Reviewed therapy notes  Discussed with therapy about patient  Continue with safety measures and fall precautions

## 2023-12-12 ENCOUNTER — NURSING HOME VISIT (OUTPATIENT)
Dept: GERIATRICS | Facility: OTHER | Age: 88
End: 2023-12-12
Payer: MEDICARE

## 2023-12-12 DIAGNOSIS — K21.9 GASTROESOPHAGEAL REFLUX DISEASE WITHOUT ESOPHAGITIS: ICD-10-CM

## 2023-12-12 DIAGNOSIS — I10 ESSENTIAL HYPERTENSION: ICD-10-CM

## 2023-12-12 DIAGNOSIS — E78.5 DYSLIPIDEMIA: ICD-10-CM

## 2023-12-12 DIAGNOSIS — E89.0 POSTOPERATIVE HYPOTHYROIDISM: ICD-10-CM

## 2023-12-12 DIAGNOSIS — N18.31 STAGE 3A CHRONIC KIDNEY DISEASE (HCC): ICD-10-CM

## 2023-12-12 DIAGNOSIS — D62 ACUTE BLOOD LOSS ANEMIA: ICD-10-CM

## 2023-12-12 DIAGNOSIS — F01.B3 MODERATE VASCULAR DEMENTIA WITH MOOD DISTURBANCE (HCC): ICD-10-CM

## 2023-12-12 DIAGNOSIS — N40.0 BENIGN NON-NODULAR PROSTATIC HYPERPLASIA: ICD-10-CM

## 2023-12-12 DIAGNOSIS — R26.2 AMBULATORY DYSFUNCTION: Primary | ICD-10-CM

## 2023-12-12 DIAGNOSIS — K59.04 CHRONIC IDIOPATHIC CONSTIPATION: ICD-10-CM

## 2023-12-12 DIAGNOSIS — G47.9 SLEEP DISTURBANCE: ICD-10-CM

## 2023-12-12 DIAGNOSIS — H04.123 DRY EYE SYNDROME OF BOTH EYES: ICD-10-CM

## 2023-12-12 PROCEDURE — 99316 NF DSCHRG MGMT 30 MIN+: CPT | Performed by: NURSE PRACTITIONER

## 2023-12-12 RX ORDER — SENNOSIDES A AND B 8.6 MG/1
1 TABLET, FILM COATED ORAL 2 TIMES DAILY
Qty: 60 TABLET | Refills: 0 | Status: SHIPPED | OUTPATIENT
Start: 2023-12-12 | End: 2024-01-11

## 2023-12-12 RX ORDER — CYCLOSPORINE 0.5 MG/ML
1 EMULSION OPHTHALMIC EVERY 12 HOURS
Qty: 6 ML | Refills: 0 | Status: SHIPPED | OUTPATIENT
Start: 2023-12-12 | End: 2024-01-11

## 2023-12-12 RX ORDER — CALCIUM CARBONATE 500 MG/1
1 TABLET, CHEWABLE ORAL 4 TIMES DAILY PRN
COMMUNITY
End: 2023-12-12 | Stop reason: SDUPTHER

## 2023-12-12 RX ORDER — LOSARTAN POTASSIUM 25 MG/1
25 TABLET ORAL DAILY
Qty: 30 TABLET | Refills: 0 | Status: SHIPPED | OUTPATIENT
Start: 2023-12-12 | End: 2024-01-11

## 2023-12-12 RX ORDER — FLUTICASONE PROPIONATE 110 UG/1
1 AEROSOL, METERED RESPIRATORY (INHALATION) 2 TIMES DAILY
Qty: 12 G | Refills: 0 | Status: SHIPPED | OUTPATIENT
Start: 2023-12-12 | End: 2024-01-11

## 2023-12-12 RX ORDER — CALCIUM CARBONATE 500 MG/1
1 TABLET, CHEWABLE ORAL 4 TIMES DAILY PRN
Qty: 60 TABLET | Refills: 0 | Status: SHIPPED | OUTPATIENT
Start: 2023-12-12 | End: 2024-01-11

## 2023-12-12 RX ORDER — LEVOTHYROXINE SODIUM 0.15 MG/1
150 TABLET ORAL DAILY
COMMUNITY
End: 2023-12-12 | Stop reason: SDUPTHER

## 2023-12-12 RX ORDER — POLYETHYLENE GLYCOL 3350 17 G/17G
17 POWDER, FOR SOLUTION ORAL DAILY
Qty: 510 G | Refills: 0 | Status: SHIPPED | OUTPATIENT
Start: 2023-12-12 | End: 2024-01-11

## 2023-12-12 RX ORDER — ANTIARTHRITIC COMBINATION NO.2 900 MG
1 TABLET ORAL DAILY
Qty: 30 CAPSULE | Refills: 0 | Status: SHIPPED | OUTPATIENT
Start: 2023-12-12 | End: 2024-01-11

## 2023-12-12 RX ORDER — FINASTERIDE 5 MG/1
5 TABLET, FILM COATED ORAL DAILY
Qty: 30 TABLET | Refills: 0 | Status: SHIPPED | OUTPATIENT
Start: 2023-12-12 | End: 2024-01-11

## 2023-12-12 RX ORDER — LEVOTHYROXINE SODIUM 0.07 MG/1
75 TABLET ORAL
Qty: 16 TABLET | Refills: 0 | Status: SHIPPED | OUTPATIENT
Start: 2023-12-12 | End: 2024-01-11

## 2023-12-12 RX ORDER — LEVOTHYROXINE SODIUM 0.15 MG/1
150 TABLET ORAL DAILY
Qty: 30 TABLET | Refills: 0 | Status: SHIPPED | OUTPATIENT
Start: 2023-12-12 | End: 2024-01-11

## 2023-12-12 RX ORDER — OMEPRAZOLE 20 MG/1
20 CAPSULE, DELAYED RELEASE ORAL DAILY
Qty: 30 CAPSULE | Refills: 0 | Status: SHIPPED | OUTPATIENT
Start: 2023-12-12 | End: 2024-01-11

## 2023-12-12 RX ORDER — TERAZOSIN 2 MG/1
2 CAPSULE ORAL
Qty: 30 CAPSULE | Refills: 0 | Status: SHIPPED | OUTPATIENT
Start: 2023-12-12 | End: 2024-01-11

## 2023-12-12 RX ORDER — TRAZODONE HYDROCHLORIDE 50 MG/1
50 TABLET ORAL
Qty: 30 TABLET | Refills: 0 | Status: SHIPPED | OUTPATIENT
Start: 2023-12-12 | End: 2024-01-11

## 2023-12-12 RX ORDER — LEVOTHYROXINE SODIUM 0.07 MG/1
75 TABLET ORAL
COMMUNITY
End: 2023-12-12 | Stop reason: SDUPTHER

## 2023-12-12 RX ORDER — NIFEDIPINE 60 MG/1
60 TABLET, EXTENDED RELEASE ORAL DAILY
Qty: 30 TABLET | Refills: 0 | Status: SHIPPED | OUTPATIENT
Start: 2023-12-12 | End: 2024-01-11

## 2023-12-12 NOTE — ASSESSMENT & PLAN NOTE
BP goal: < 140-150/90  Ave SBP in SNF: 125-130 (L: 116/49 / H: 156/72)  HR range in SNF: 56 to 78/min  Continue current meds: Losartan 25mg daily/ Nifedepine 60mg daily

## 2023-12-12 NOTE — ASSESSMENT & PLAN NOTE
Trending up baseline Crea in 2023: 1.1 to 1.3  Renal functions (11/24/2023): Crea: 1.29/ BUN: 32/ eGFR: 52  Continue to monitor for adequate oral fluid intake and meal completion  Continue to avoid hypotensive episodes and use of nephrotoxic medications

## 2023-12-12 NOTE — ASSESSMENT & PLAN NOTE
Multifactorial  Hx of falls at home  LCD for therapy in SNF: 12/13/2023  Continue fall precaution and safety measures at home  Continue PT/OT as out-patient  Patient will D/C to [facility] on 12/14/2023

## 2023-12-12 NOTE — ASSESSMENT & PLAN NOTE
TSH (4/25/2023): 16.61 (H)  Free T4 (4/25/2023): 0.98 (WNL)  Continue home dose:  * Levothyroxine 150mcg daily  * Levothyroxine 225mcg 4 x weekly (Monday/Friday/Saturday/Sunday)  To follow-up with PCP upon discharge

## 2023-12-12 NOTE — ASSESSMENT & PLAN NOTE
Hx of ABLA  Hbg/Hct levels stable (11/24/2023): 12.1/34.7 (L)  Continue MVI (Men's [facility] Advanced) daily

## 2023-12-12 NOTE — ASSESSMENT & PLAN NOTE
Patient is alert, cooperative, calm and pleasant in SNF  No behavioral disturbance reported in SNF  Spouse is the primary caregiver at home  No weight loss on SNF - reported with fair to good meal completion  Continue to redirect and reorient as often as needed  Continue to monitor for sleep disturbance/ mood/ anxiety/ delirium

## 2023-12-12 NOTE — ASSESSMENT & PLAN NOTE
With hx of urinary retention  Patient denies any  related concerns on this visit  Continue Hytrin 2mg at HS/ Finasteride 5mg daily

## 2023-12-12 NOTE — PROGRESS NOTES
[address]  [phone]  [address] : [facility]  POS: 31: SNF/Short Term Rehab      NAME: Maryan Cardoso  AGE: 80 y.o. SEX: male  DATE OF ADMISSION: NOVEMBER 17, 2023  DATE OF DISCHARGE: DECEMBER 14, 2023  DISCHARGE DISPOSITION: TO [facility] ([address], [address])    Reason for admission: Patient was admitted from  [place], [place] for rehabilitation after hospitalization for ambulatory dysfunction and deconditioning. This is a 35-year-old male patient currently admitted at [address] [place] [address] (11/17/2023 to present following discharge from acute care hospitalization ([facility]: 11/14-17/2023) with Dx of Confusion, Ambulatory dysfunction, Dementia, Essential HTN, CKD3A, Hyperlipidemia and hypothyroidism. Admission Diagnoses: Ambulatory dysfunction  Discharge Diagnoses:   Ambulatory Dysfunction with deconditioning  Dementia without behavioral disturbance  CKD3A  Essential HTN  Dyslipidemia  Hypothyroidism  BPH with Urinary Retention  GERD  Constipation  Sleep disturbance  Dry Eye Syndrome  Anemia - Hx of ABLA    Course of stay: Patient is currently admitted at [address] [place] [address] for rehabilitation services due to ambulatory dysfunction and deconditioning. Patient received 24/7 SNF supportive care, PT/OT/ST services. To date, patient stay in [facility] has been uneventful. Patient is out of bed for this visit sitting in recliner in room, alert, cooperative, calm, pleasant and not in distress. Patient is verbal with clear coherent speech -oriented to name/birthday/current date. Patient acknowledged feeling well on this visit denies any acute medical concerns during ROS assessment except for chronic pain the posterior neck -heat therapy provided by OT on this visit. Per , patient is scheduled for discharge to [facility], [address]) on 12/14/2023. All medication lists sent by e-script to preferred Pharmacy ([facility]).      Therapy Progress Note: Per PT/ OT progress note reviewed. May be requested through the SNF.     Labs and testing performed during stay: Most recent lab results as below:     CBC with diff (11/24/2023):  (ABNORMAL) CBC WITH DIFF (11/24/2023 5:20 AM EST)  Lab Results - (ABNORMAL) CBC WITH DIFF (11/24/2023 5:20 AM EST)  Component Value Ref Range Test Method Analysis Time Performed At Pathologist Signature   Hemoglobin 12.1 (L) 12.5 - 17.0 g/dL   11/24/2023 9:53 AM EST [facility] ([facility])     Hematocrit 34.7 (L) 37.0 - 48.0 %   11/24/2023 9:53 AM EST [facility] ([facility])     WBC 6.4 4.0 - 10.5 thou/cmm   11/24/2023 9:53 AM EST [facility] ([facility])     RBC 3.78 (L) 4.00 - 5.40 mill/cmm   11/24/2023 9:53 AM EST [facility] ([facility])     Platelet Count 258 292 - 350 thou/cmm   11/24/2023 9:53 AM EST [facility] ([facility])     MPV 8.5 7.5 - 11.3 fL   11/24/2023 9:53 AM EST [facility]     MCV 92 80 - 100 fL   11/24/2023 9:53 AM EST [facility])     MCH 31.9 27.0 - 36.0 pg   11/24/2023 9:53 AM EST [facility] ([facility])     MCHC 34.8 32.0 - 37.0 g/dL   11/24/2023 9:53 AM EST [facility]     RDW 13.1 12.0 - 16.0 %   11/24/2023 9:53 AM EST [facility]     Differential Type AUTO     11/24/2023 9:53 AM EST [facility]     Absolute Neutrophils 3.8 1.8 - 7.8 thou/cmm   11/24/2023 9:53 AM EST [facility])     Absolute Lymphocytes 1.5 1.0 - 3.0 thou/cmm   11/24/2023 9:53 AM EST [facility] ([facility])     Absolute Monocytes 0.6 0.3 - 1.0 thou/cmm   11/24/2023 9:53 AM EST [facility] ([facility])     Absolute Eosinophils 0.5 0.0 - 0.5 thou/cmm   11/24/2023 9:53 AM EST [facility] ([facility])     Absolute Basophils 0.1 0.0 - 0.1 thou/cmm   11/24/2023 9:53 AM EST [facility] ([facility])     Neutrophils 58 %   11/24/2023 9:53 AM EST [facility])     Lymphocytes 23 %   11/24/2023 9:53 AM EST [facility])     Monocytes 10 %   11/24/2023 9:53 AM EST [facility]     Eosinophils 8 %   11/24/2023 9:53 AM EST [facility])     Basophils 1 %   11/24/2023 9:53 AM EST [facility]        BMP (11/24/2023):  (ABNORMAL) BASIC METABOLIC PANEL (03/07/2175 5:20 AM EST)  Lab Results - (ABNORMAL) BASIC METABOLIC PANEL (05/29/3533 5:20 AM EST)  Component Value Ref Range Test Method Analysis Time Performed At Pathologist Signature   Glucose 86 65 - 99 mg/dL   11/24/2023 10:13 AM EST [facility] ([facility]     BUN 32 (H) 7 - 28 mg/dL   11/24/2023 10:13 AM EST [facility]     Creatinine 1.29 0.53 - 1.30 mg/dL   11/24/2023 10:13 AM EST [facility]     Sodium 143 135 - 145 mmol/L   11/24/2023 10:13 AM EST [facility])     Potassium 4.3 3.5 - 5.2 mmol/L   11/24/2023 10:13 AM EST [facility]     Chloride 110 (H) 100 - 109 mmol/L   11/24/2023 10:13 AM EST [facility]     Carbon Dioxide 23 21 - 31 mmol/L   11/24/2023 10:13 AM EST [facility]     Calcium 8.1 (L) 8.5 - 10.1 mg/dL   11/24/2023 10:13 AM EST [facility]     Anion Gap 10 3 - 11   11/24/2023 10:13 AM EST [facility]     eGFRcr 52 (L) >59   11/24/2023 10:13 AM EST [facility]     eGFRcr Comment Interpretive information: calculated GFR     11/24/2023 10:13 AM EST [facility] ([facility])         Lipid Test (4/25/2023):  LIPID PANEL (04/25/2023 10:55 AM EDT)  Lab Results - LIPID PANEL (04/25/2023 10:55 AM EDT)  Component Value Ref Range Test Method Analysis Time Performed At Pathologist Signature   Cholesterol 163 <200 mg/dL   04/25/2023 6:45 PM EDT [facility]     Triglyceride 100 <150 mg/dL   04/25/2023 6:45 PM EDT [facility]     Cholesterol, HDL, Direct 59 23 - 92 mg/dL   04/25/2023 6:45 PM EDT [facility] ([facility])     Cholesterol, Non- <160 mg/dL   04/25/2023 6:45 PM EDT [facility] ([facility])     Cholesterol, LDL, Calculated 84 <130 mg/dL   04/25/2023 6:45 PM EDT [facility] ([facility])     Comment: LDL Cholesterol was calculated using the Friedewald equation.  Direct measurement of LDL is not indicated for this patient based on [facility]'s analytical algorithm for measurement of LDL Cholesterol. CHOL/HDL Ratio 2.8     04/25/2023 6:45 PM EDT [facility] ([facility])         HISTORY:  Medical Hx: Reviewed, unchanged  Family Hx: Reviewed, unchanged  Soc Hx: Reviewed,  unchanged    ALLERGY: Reviewed, unchanged. Allergies   Allergen Reactions    Ace Inhibitors Other (See Comments)     cough and rash  Other reaction(s): Other (See Comments)  cough and rash      Amoxicillin Other (See Comments)     GI upset  Other reaction(s): Other (See Comments)  GI upset         Discharge Medications: See discharge medication list which was reviewed and signed. Status at time of discharge: Stable    Today's Visit: 12/12/20234:52 PM    Subjective:" I'm good"    Vitals: T98.9F -P77 -R18 BP: 121/54 (12/8/2023) SpO2: 95% RA  Weight: 157.8 lbs (12/6/2023) <= 152.7 lbs (11/18/2023)    Review of Systems   Constitutional: Negative. HENT: Negative. Eyes: Negative. Respiratory: Negative. Cardiovascular: Negative. Gastrointestinal: Negative. Endocrine: Negative. Genitourinary: Negative. Musculoskeletal: Negative. Reported chronic posterior neck pain   Skin: Negative. Allergic/Immunologic: Negative. Neurological: Negative. Hematological: Negative. Psychiatric/Behavioral: Negative. All other systems reviewed and are negative. Exam: Physical Exam  Vitals and nursing note reviewed. Constitutional:       General: He is not in acute distress. Appearance: Normal appearance. He is normal weight. He is not ill-appearing, toxic-appearing or diaphoretic. Comments: Frail stature   HENT:      Head: Normocephalic and atraumatic. Right Ear: Tympanic membrane, ear canal and external ear normal. There is no impacted cerumen. Left Ear: Tympanic membrane and ear canal normal. There is no impacted cerumen. Nose: Nose normal. No congestion or rhinorrhea.       Mouth/Throat:      Mouth: Mucous membranes are moist.      Pharynx: Oropharynx is clear. No oropharyngeal exudate or posterior oropharyngeal erythema. Eyes:      General: No scleral icterus. Right eye: No discharge. Left eye: No discharge. Extraocular Movements: Extraocular movements intact. Conjunctiva/sclera: Conjunctivae normal.      Comments: Wears prescription glasses. Neck:      Vascular: No carotid bruit. Cardiovascular:      Rate and Rhythm: Normal rate. Rhythm irregular. Pulses: Normal pulses. Heart sounds: Normal heart sounds. No murmur heard. No friction rub. No gallop. Pulmonary:      Effort: Pulmonary effort is normal. No respiratory distress. Breath sounds: Normal breath sounds. No stridor. No wheezing, rhonchi or rales. Chest:      Chest wall: No tenderness. Abdominal:      General: Abdomen is flat. Bowel sounds are normal. There is no distension. Palpations: Abdomen is soft. There is no mass. Tenderness: There is no abdominal tenderness. There is no right CVA tenderness, left CVA tenderness, guarding or rebound. Hernia: No hernia is present. Genitourinary:     Comments: Non distended bladder. Continent. Musculoskeletal:         General: No swelling, tenderness, deformity or signs of injury. Normal range of motion. Cervical back: Normal range of motion and neck supple. No rigidity or tenderness. Right lower leg: Edema present. Left lower leg: Edema present. Comments: Ambulatory dysfunction. B/L LE edema trace vs +1   Lymphadenopathy:      Cervical: No cervical adenopathy. Skin:     General: Skin is warm. Capillary Refill: Capillary refill takes less than 2 seconds. Coloration: Skin is not jaundiced or pale. Findings: No bruising, erythema, lesion or rash. Comments: Intact skin. No DTI to bony prominences. Neurological:      General: No focal deficit present. Mental Status: He is alert.  Mental status is at baseline. Sensory: No sensory deficit. Gait: Gait abnormal.      Comments: Verbal with clear coherent speech - Oriented to name/birthday/current date   Psychiatric:         Mood and Affect: Mood normal.         Behavior: Behavior normal.      Comments: Alert, cooperative, calm, very pleasant and not in distress. Discussion with patient/family and further instructions:  -Fall precautions  -Monitor for signs/symptoms of infection  -Medication list was reviewed and signed  -DME form was completed      Follow-up Recommendations:     * Please follow-up with your primary care physician within 7-10 days of discharge to review medication changes and current status. The family/ patient needs to set-up an appointment for JOANNE CROOK with PCP upon discharge to [facility]. Please make every effort to attend this appointment. Should there be a change in your medical condition and an earlier appointment is needed please call the number provided above. * Recommend PT/OT service to continue strengthening, gait, balance and overall functional independence improvement gained during in-patient rehabilitation. * Recommend VNA to assist in medication management and other skilled nursing needs during transition to home.       Problem List Follow-up Recommendations:    Ambulatory dysfunction  Multifactorial  Hx of falls at home  LCD for therapy in SNF: 12/13/2023  Continue fall precaution and safety measures at home  Continue PT/OT as out-patient  Patient will D/C to [facility] on 12/14/2023    Moderate vascular dementia with mood disturbance ([address])  Patient is alert, cooperative, calm and pleasant in SNF  No behavioral disturbance reported in SNF  Spouse is the primary caregiver at home  No weight loss on SNF - reported with fair to good meal completion  Continue to redirect and reorient as often as needed  Continue to monitor for sleep disturbance/ mood/ anxiety/ delirium    Stage 3a chronic kidney disease ([address])  Trending up baseline Crea in 2023: 1.1 to 1.3  Renal functions (11/24/2023): Crea: 1.29/ BUN: 32/ eGFR: 52  Continue to monitor for adequate oral fluid intake and meal completion  Continue to avoid hypotensive episodes and use of nephrotoxic medications    Essential hypertension  BP goal: < 140-150/90  Ave SBP in SNF: 125-130 (L: 116/49 / H: 156/72)  HR range in SNF: 56 to 78/min  Continue current meds: Losartan 25mg daily/ Nifedepine 60mg daily    Dyslipidemia  Last Lipid test [facility] (4/25/2023)  Not on statin therapy    Postoperative hypothyroidism  TSH (4/25/2023): 16.61 (H)  Free T4 (4/25/2023): 0.98 ([facility])  Continue home dose:  * Levothyroxine 150mcg daily  * Levothyroxine 225mcg 4 x weekly (Monday/Friday/Saturday/Sunday)  To follow-up with PCP upon discharge    Benign non-nodular prostatic hyperplasia  With hx of urinary retention  Patient denies any  related concerns on this visit  Continue Hytrin 2mg at HS/ Finasteride 5mg daily    Gastroesophageal reflux disease without esophagitis  Patient denies any new or worsening symptoms  Continue Omeprazole 20mg daily/ PRN TUMS    Chronic idiopathic constipation  Patient reported daily BM in SNF  Continue Senna BID/ Miralax daily    Sleep disturbance  Patient reported that he is sleeping well though the night in SNF  Continue Trazodone 50mg at HS    Dry eye syndrome of both eyes  Continue Restasis eyedrops BID    Acute blood loss anemia  Hx of ABLA  Hbg/Hct levels stable (11/24/2023): 12.1/34.7 (L)  Continue MVI (Men's 50 Advanced) daily      CURRENT MEDICATION LIST IN [facility] - SNF:    Current Outpatient Medications:     calcium carbonate (Tums) 500 mg chewable tablet, Chew 1 tablet 4 (four) times a day as needed for indigestion or heartburn, Disp: , Rfl:     cycloSPORINE (RESTASIS) 0.05 % ophthalmic emulsion, Administer 1 drop to both eyes every 12 (twelve) hours, Disp: , Rfl:     finasteride (PROSCAR) 5 mg tablet, Take 5 mg by mouth in the morning., Disp: , Rfl:     fluticasone (Flovent HFA) 110 MCG/ACT inhaler, Inhale 1 puff 2 (two) times a day, Disp: , Rfl:     levothyroxine 150 mcg tablet, Take 150 mcg by mouth daily, Disp: , Rfl:     levothyroxine 75 mcg tablet, Take 75 mcg by mouth 4 (four) times a week = given on Monday/Friday/Saturday/Sunday together with Levothyroxine 150mcg tablet for a total of 225mcg., Disp: , Rfl:     losartan (COZAAR) 25 mg tablet, Take 25 mg by mouth daily, Disp: , Rfl:     Multiple Vitamins-Minerals (MENS 50+ ADVANCED PO), Take 1 tablet by mouth in the morning, Disp: , Rfl:     NIFEdipine (PROCARDIA XL) 60 mg 24 hr tablet, Take 60 mg by mouth daily, Disp: , Rfl:     omeprazole (PriLOSEC) 20 mg delayed release capsule, Take 1 capsule by mouth in the morning., Disp: , Rfl:     polyethylene glycol (GLYCOLAX) 17 GM/SCOOP powder, Take 17 g by mouth in the morning., Disp: , Rfl:     senna (SENOKOT) 8.6 MG tablet, Take 1 tablet by mouth 2 (two) times a day, Disp: , Rfl:     terazosin (HYTRIN) 2 mg capsule, Take 2 mg by mouth daily at bedtime, Disp: , Rfl:     traZODone (DESYREL) 50 mg tablet, Take 50 mg by mouth daily at bedtime, Disp: , Rfl:       Discharge Statement:  * Spent more than 30 minutes discharging the patient; greater than 50% spent on physical examination of patient, answering questions, discussion of plan of care, recommendations and providing post discharge instructions. Additional time spend on other discharge related activities including documentation. Please note: This note was completed in part utilizing a voice-recognition software may have been used in the preparation of this document. Grammatical errors, random word insertion, spelling mistakes, and incomplete sentences may be an occasional consequence of the system secondary to software limitations, ambient noise and hardware issues. Occasional wrong word or "sound-alike" substitutions may have occurred due to the inherent limitations of voice recognition software.  At the time of dictation, efforts were made to edit, clarify and/or correct errors. Interpretation should be guided by context. Please read the chart carefully and recognize, using context, where substitutions have occurred. If you have any questions or concerns about the context, text or information contained within the body of this dictation, please contact myself, the provider, for further clarification.       [address] [facility] Jil Mar  30/47/67185:79 PM